# Patient Record
Sex: MALE | Race: WHITE | NOT HISPANIC OR LATINO | URBAN - METROPOLITAN AREA
[De-identification: names, ages, dates, MRNs, and addresses within clinical notes are randomized per-mention and may not be internally consistent; named-entity substitution may affect disease eponyms.]

---

## 2017-03-06 ENCOUNTER — OFFICE VISIT (OUTPATIENT)
Dept: RETAIL CLINIC | Facility: CLINIC | Age: 27
End: 2017-03-06

## 2017-03-06 VITALS
BODY MASS INDEX: 17.5 KG/M2 | WEIGHT: 125 LBS | TEMPERATURE: 98.5 F | OXYGEN SATURATION: 98 % | HEIGHT: 71 IN | RESPIRATION RATE: 20 BRPM | HEART RATE: 84 BPM

## 2017-03-06 DIAGNOSIS — J01.90 ACUTE SINUSITIS, RECURRENCE NOT SPECIFIED, UNSPECIFIED LOCATION: Primary | ICD-10-CM

## 2017-03-06 DIAGNOSIS — H10.31 ACUTE CONJUNCTIVITIS OF RIGHT EYE, UNSPECIFIED ACUTE CONJUNCTIVITIS TYPE: ICD-10-CM

## 2017-03-06 LAB
EXPIRATION DATE: NORMAL
FLUAV AG NPH QL: NEGATIVE
FLUBV AG NPH QL: NEGATIVE
INTERNAL CONTROL: NORMAL
Lab: NORMAL

## 2017-03-06 PROCEDURE — 87804 INFLUENZA ASSAY W/OPTIC: CPT | Performed by: NURSE PRACTITIONER

## 2017-03-06 PROCEDURE — 99203 OFFICE O/P NEW LOW 30 MIN: CPT | Performed by: NURSE PRACTITIONER

## 2017-03-06 RX ORDER — DOXYCYCLINE HYCLATE 100 MG/1
100 CAPSULE ORAL 2 TIMES DAILY
Qty: 14 CAPSULE | Refills: 0 | Status: SHIPPED | OUTPATIENT
Start: 2017-03-06 | End: 2017-03-13

## 2017-03-06 RX ORDER — POLYMYXIN B SULFATE AND TRIMETHOPRIM 1; 10000 MG/ML; [USP'U]/ML
1 SOLUTION OPHTHALMIC EVERY 4 HOURS
Qty: 10 ML | Refills: 0 | Status: SHIPPED | OUTPATIENT
Start: 2017-03-06 | End: 2017-03-13

## 2017-03-06 NOTE — PROGRESS NOTES
Subjective   Mateo Willoughby is a 26 y.o. male presents for possible pink eye, sinus infection or the flu.  Denies having flu shot this season and unsure of exposure.  States has been having sinus problems for over one week and eye problems started 2 days ago.      Sinusitis   This is a new problem. The current episode started 1 to 4 weeks ago. The problem has been gradually worsening since onset. Maximum temperature: has not cked. His pain is at a severity of 4/10. The pain is moderate. Associated symptoms include chills, congestion, ear pain (ear pressure), headaches (frontal sinus pressure), sinus pressure, sneezing and a sore throat. Pertinent negatives include no coughing, diaphoresis, hoarse voice, neck pain, shortness of breath or swollen glands. Treatments tried: dayquil, tylenol. The treatment provided mild relief.   Conjunctivitis    The current episode started 2 days ago. The problem occurs continuously. The problem has been gradually worsening. The problem is mild. Nothing relieves the symptoms. Nothing aggravates the symptoms. Associated symptoms include eye itching, photophobia, congestion, ear pain (ear pressure), headaches (frontal sinus pressure), rhinorrhea, sore throat, eye discharge (states having yellow eye drainage with some crusting of eye lashes in the am) and eye redness. Pertinent negatives include no fever (has not cked), no decreased vision, no double vision, no ear discharge, no hearing loss, no mouth sores, no stridor, no swollen glands, no neck pain, no cough, no wheezing and no eye pain. The right eye is affected. There were no sick contacts. He has received no recent medical care.        The following portions of the patient's history were reviewed and updated as appropriate: allergies, current medications, past family history, past medical history, past social history and past surgical history.    Review of Systems   Constitutional: Positive for chills. Negative for diaphoresis,  fever (has not cked) and unexpected weight change.   HENT: Positive for congestion, ear pain (ear pressure), postnasal drip, rhinorrhea, sinus pressure, sneezing and sore throat. Negative for ear discharge, hearing loss, hoarse voice, mouth sores, trouble swallowing and voice change.    Eyes: Positive for photophobia, discharge (states having yellow eye drainage with some crusting of eye lashes in the am), redness and itching. Negative for double vision and pain.   Respiratory: Negative for cough, chest tightness, shortness of breath, wheezing and stridor.    Cardiovascular: Negative.    Gastrointestinal: Negative.    Genitourinary: Negative.    Musculoskeletal: Negative.  Negative for neck pain.   Skin: Negative.    Neurological: Positive for headaches (frontal sinus pressure).       Objective   Physical Exam   Constitutional: He is oriented to person, place, and time. He appears well-developed and well-nourished. No distress.   HENT:   Head: Normocephalic and atraumatic.   Right Ear: External ear and ear canal normal. Tympanic membrane is not erythematous. A middle ear effusion is present.   Left Ear: External ear and ear canal normal. Tympanic membrane is not erythematous. A middle ear effusion is present.   Nose: Mucosal edema present. Right sinus exhibits maxillary sinus tenderness and frontal sinus tenderness. Left sinus exhibits maxillary sinus tenderness and frontal sinus tenderness.   Mouth/Throat: Uvula is midline and mucous membranes are normal. No uvula swelling. Posterior oropharyngeal erythema (mild erythema with pnd) present. Tonsils are 0 on the right. Tonsils are 0 on the left. No tonsillar exudate.   Eyes: EOM and lids are normal. Pupils are equal, round, and reactive to light. Right conjunctiva is injected. Left conjunctiva is not injected.   Right eye conjunctivae mild injected, no drainage at present in either eye.    Visual acuity Right 20/13, Left 20/10 and Bilat 20/10   Neck: Normal range of  motion.   Cardiovascular: Normal rate, regular rhythm and normal heart sounds.    No murmur heard.  Pulmonary/Chest: Effort normal and breath sounds normal. No respiratory distress. He has no wheezes.   Lymphadenopathy:     He has cervical adenopathy (bilat anterior cervical).   Neurological: He is alert and oriented to person, place, and time.   Skin: Skin is warm and dry. No rash noted.   Psychiatric: He has a normal mood and affect. His speech is normal and behavior is normal. Thought content normal.       Assessment/Plan   Diagnoses and all orders for this visit:    Acute sinusitis, recurrence not specified, unspecified location  -     POC Influenza A / B, negative, discussed with patient  -     doxycycline (VIBRAMYCIN) 100 MG capsule; Take 1 capsule by mouth 2 (Two) Times a Day for 7 days.  -     loratadine-pseudoephedrine (CLARITIN-D 24 HOUR)  MG per 24 hr tablet; Take 1 tablet by mouth Daily for 10 days.      Acute conjunctivitis of right eye, unspecified acute conjunctivitis type  -     trimethoprim-polymyxin b (POLYTRIM) 10903-3.1 UNIT/ML-% ophthalmic solution; Administer 1 drop to the right eye Every 4 (Four) Hours for 7 days.      Medication and plan of care reviewed with patient and teaching done on med reactions. Eye care as discussed, if worse go to eye doctor, urgent care or ER as discussed.  Rest, plenty of fluids, comfort measures, warm salt water gargles, humidifier, and follow up with PCP if symptoms persist.  If worse go to urgent care or ER as discussed.  Patient verbalized understanding.    VALARIE Osullivan

## 2017-03-06 NOTE — PATIENT INSTRUCTIONS
Loratadine; Pseudoephedrine tablets  What is this medicine?  LORATADINE; PSEUDOEPHEDRINE (kelsey AT a kait; ric mast e FED rin) is a combination of an antihistamine and a decongestant. This medicine is used to treat the symptoms of allergies. It reduces congestion, sneezing, runny nose, and itching.  This medicine may be used for other purposes; ask your health care provider or pharmacist if you have questions.  COMMON BRAND NAME(S): Alavert-D, Allergy and Sinus Relief, Claritin-D, Claritin-D 12 Hour, Claritin-D 24 Hour, Clear-Atadine D  What should I tell my health care provider before I take this medicine?  They need to know if you have any of these conditions:  -diabetes  -difficulty urinating  -heart disease  -high blood pressure  -kidney disease  -liver disease  -thyroid disease  -an unusual or allergic reaction to loratadine, pseudoephedrine, other medicines, foods, dyes, or preservatives  -pregnant or trying to get pregnant  -breast-feeding  How should I use this medicine?  Take this medicine by mouth with a full glass of water. Follow the directions on the label. You can take it with or without food. Do not cut, crush or chew this medicine. Take your medicine at regular intervals. Do not take your medicine more often than directed.  Talk to your pediatrician regarding the use of this medicine in children. While this medicine may be used in children as young as 12 years for selected conditions, precautions do apply.  Overdosage: If you think you have taken too much of this medicine contact a poison control center or emergency room at once.  NOTE: This medicine is only for you. Do not share this medicine with others.  What if I miss a dose?  If you miss a dose, take it as soon as you can. If it is almost time for your next dose, take only that dose. Do not take double or extra doses.  What may interact with this medicine?  Do not take this medicine with any of the following medications:  -bromocriptine  -ergot  alkaloids like dihydroergotamine, ergonovine, ergotamine, methylergonovine  -MAOIs like Carbex, Eldepryl, Marplan, Nardil, and Parnate  -medicines for allergies, colds, breathing difficulties  -procarbazine  -stimulant medicines for attention disorders, weight loss, or to stay awake  This medicine may also interact with the following medications:  -digoxin  -efavirenz  -linezolid  -medicines for depression like amitriptyline, nortriptyline  -medicines for heart disease or blood pressure like atenolol, clonidine, doxazosin, mecamylamine, methyldopa, reserpine  -Mara's wort  -theophylline  -tizanidine  -yohimbine  This list may not describe all possible interactions. Give your health care provider a list of all the medicines, herbs, non-prescription drugs, or dietary supplements you use. Also tell them if you smoke, drink alcohol, or use illegal drugs. Some items may interact with your medicine.  What should I watch for while using this medicine?  Tell your doctor or healthcare professional if your symptoms do not start to get better, if they get worse or if you have a high fever. If you have high blood pressure, check your blood pressure regularly. Ask your health care professional what your blood pressure should be, and when you should contact him or her.  Your mouth may get dry. Chewing sugarless gum or sucking hard candy, and drinking plenty of water may help. Contact your doctor if the problem does not go away or is severe.  What side effects may I notice from receiving this medicine?  Side effects that you should report to your doctor or health care professional as soon as possible:  -allergic reactions like skin rash or hives, swelling of the face, lips, or tongue  -breathing trouble  -difficulty urinating  -difficulty sleeping  -dizzy, faint  -fast, irregular heartbeat  -high blood pressure  -nervousness, agitation  -tremor, muscle contractions  -unusually weak or tired  Side effects that usually do not  require medical attention (report to your doctor or health care professional if they continue or are bothersome):  -cough  -dry mouth  -headache  -loss of appetite  -nausea  This list may not describe all possible side effects. Call your doctor for medical advice about side effects. You may report side effects to FDA at 8-778-FDA-5494.  Where should I keep my medicine?  Keep out of the reach of children.  Store at room temperature between 15 and 25 degrees C (59 and 77 degrees F). Protect from moisture and light. Throw away any unused medicine after the expiration date.  NOTE: This sheet is a summary. It may not cover all possible information. If you have questions about this medicine, talk to your doctor, pharmacist, or health care provider.     © 2016, [employer]. (2009-06-22 17:19:09)  Doxycycline tablets or capsules  What is this medicine?  DOXYCYCLINE (dox sandra KAYLEE yu) is a tetracycline antibiotic. It kills certain bacteria or stops their growth. It is used to treat many kinds of infections, like dental, skin, respiratory, and urinary tract infections. It also treats acne, Lyme disease, malaria, and certain sexually transmitted infections.  This medicine may be used for other purposes; ask your health care provider or pharmacist if you have questions.  COMMON BRAND NAME(S): Acticlate, Adoxa, Adoxa CK, Adoxa Nacho, Adoxa TT, Alodox, Avidoxy, Doxal, Mondoxyne NL, Monodox, Morgidox 1x, Morgidox 1x Kit, Morgidox 2x, Morgidox 2x Kit, NutriDox, Ocudox, TARGADOX, Vibra-Tabs, Vibramycin  What should I tell my health care provider before I take this medicine?  They need to know if you have any of these conditions:  -liver disease  -long exposure to sunlight like working outdoors  -stomach problems like colitis  -an unusual or allergic reaction to doxycycline, tetracycline antibiotics, other medicines, foods, dyes, or preservatives  -pregnant or trying to get pregnant  -breast-feeding  How should I use this  medicine?  Take this medicine by mouth with a full glass of water. Follow the directions on the prescription label. It is best to take this medicine without food, but if it upsets your stomach take it with food. Take your medicine at regular intervals. Do not take your medicine more often than directed. Take all of your medicine as directed even if you think you are better. Do not skip doses or stop your medicine early.  Talk to your pediatrician regarding the use of this medicine in children. While this drug may be prescribed for selected conditions, precautions do apply.  Overdosage: If you think you have taken too much of this medicine contact a poison control center or emergency room at once.  NOTE: This medicine is only for you. Do not share this medicine with others.  What if I miss a dose?  If you miss a dose, take it as soon as you can. If it is almost time for your next dose, take only that dose. Do not take double or extra doses.  What may interact with this medicine?  -antacids  -barbiturates  -birth control pills  -bismuth subsalicylate  -carbamazepine  -methoxyflurane  -other antibiotics  -phenytoin  -vitamins that contain iron  -warfarin  This list may not describe all possible interactions. Give your health care provider a list of all the medicines, herbs, non-prescription drugs, or dietary supplements you use. Also tell them if you smoke, drink alcohol, or use illegal drugs. Some items may interact with your medicine.  What should I watch for while using this medicine?  Tell your doctor or health care professional if your symptoms do not improve.  Do not treat diarrhea with over the counter products. Contact your doctor if you have diarrhea that lasts more than 2 days or if it is severe and watery.  Do not take this medicine just before going to bed. It may not dissolve properly when you lay down and can cause pain in your throat. Drink plenty of fluids while taking this medicine to also help reduce  irritation in your throat.  This medicine can make you more sensitive to the sun. Keep out of the sun. If you cannot avoid being in the sun, wear protective clothing and use sunscreen. Do not use sun lamps or tanning beds/booths.  Birth control pills may not work properly while you are taking this medicine. Talk to your doctor about using an extra method of birth control.  If you are being treated for a sexually transmitted infection, avoid sexual contact until you have finished your treatment. Your sexual partner may also need treatment.  Avoid antacids, aluminum, calcium, magnesium, and iron products for 4 hours before and 2 hours after taking a dose of this medicine.  If you are using this medicine to prevent malaria, you should still protect yourself from contact with mosquitos. Stay in screened-in areas, use mosquito nets, keep your body covered, and use an insect repellent.  What side effects may I notice from receiving this medicine?  Side effects that you should report to your doctor or health care professional as soon as possible:  -allergic reactions like skin rash, itching or hives, swelling of the face, lips, or tongue  -difficulty breathing  -fever  -itching in the rectal or genital area  -pain on swallowing  -redness, blistering, peeling or loosening of the skin, including inside the mouth  -severe stomach pain or cramps  -unusual bleeding or bruising  -unusually weak or tired  -yellowing of the eyes or skin  Side effects that usually do not require medical attention (report to your doctor or health care professional if they continue or are bothersome):  -diarrhea  -loss of appetite  -nausea, vomiting  This list may not describe all possible side effects. Call your doctor for medical advice about side effects. You may report side effects to FDA at 0-455-FDA-5371.  Where should I keep my medicine?  Keep out of the reach of children.  Store at room temperature, below 30 degrees C (86 degrees F). Protect  from light. Keep container tightly closed. Throw away any unused medicine after the expiration date. Taking this medicine after the expiration date can make you seriously ill.  NOTE: This sheet is a summary. It may not cover all possible information. If you have questions about this medicine, talk to your doctor, pharmacist, or health care provider.     © 2016, [employer] Standard. (2016-04-08 12:10:28)  Polymyxin B; Trimethoprim eye drops, solution  What is this medicine?  POLYMYXIN B and TRIMETHOPRIM (molly i MIX in B and trye METH oh prim) eye drops treat certain eye infections caused by bacteria.  This medicine may be used for other purposes; ask your health care provider or pharmacist if you have questions.  COMMON BRAND NAME(S): Polytrim  What should I tell my health care provider before I take this medicine?  They need to know if you have any of these conditions:  -wear contact lenses  -an unusual or allergic reaction to polymyxin B, trimethoprim, other medicines, foods, dyes, or preservatives  -pregnant or trying to get pregnant  -breast-feeding  How should I use this medicine?  This medicine is used in the eye. Follow the directions on the prescription label. Wash your hands before and after use. Tilt your head back slightly. Pull your lower eyelid down gently to form a pouch. Do not touch the tip of the dropper to your eye, fingertips, or other surface. Squeeze the prescribed number of drops into the pouch. Close the eye gently to spread the drops. Use your medicine at regular intervals. Do not take your medicine more often than directed. Use all of your medicine as directed even if you think your are better. Do not skip doses or stop your medicine early.  Talk to your pediatrician regarding the use of this medicine in children. While this drug may be prescribed for children and infants for selected conditions, precautions do apply.  Overdosage: If you think you have taken too much of this medicine  contact a poison control center or emergency room at once.  NOTE: This medicine is only for you. Do not share this medicine with others.  What if I miss a dose?  If you miss a dose, use it as soon as you can. If it is almost time for your next dose, use only that dose. Do not use double or extra doses.  What may interact with this medicine?  Interactions are not expected. Do not use any other eye products without advice of your doctor or health care professional.  This list may not describe all possible interactions. Give your health care provider a list of all the medicines, herbs, non-prescription drugs, or dietary supplements you use. Also tell them if you smoke, drink alcohol, or use illegal drugs. Some items may interact with your medicine.  What should I watch for while using this medicine?  Check with your doctor or health care professional if your condition does not get better after 5 days, or if it gets worse.  If you wear contact lenses, ask when you can use your lenses again.  A burning or stinging reaction that does not go away may mean you are allergic to this product. Stop use and call your doctor or health care professional.  To prevent the spread of infection, do not share eye products or other personal items with anyone else.  What side effects may I notice from receiving this medicine?  Side effects that you should report to your doctor or health care professional as soon as possible:  -burning, stinging, or swelling  -change in vision or blurred vision that will not go away  -eye pain  -itching and redness  -rash  Side effects that usually do not require medical attention (report to your doctor or health care professional if they continue or are bothersome):  -temporary blurred vision after applying  -temporary watering or stinging  This list may not describe all possible side effects. Call your doctor for medical advice about side effects. You may report side effects to FDA at  1-800-FDA-1088.  Where should I keep my medicine?  Keep out of the reach of children.  Store at room temperature 15 to 25 degrees C (59 to 77 degrees F). Protect from light. To prevent the spread of infection, it is best to throw away any unused eye drops after you finish the course of treatment. Throw away any unused medicine after the expiration date.  NOTE: This sheet is a summary. It may not cover all possible information. If you have questions about this medicine, talk to your doctor, pharmacist, or health care provider.     © 2016, [employer] Standard. (2009-07-21 11:36:42)  Bacterial Conjunctivitis  Bacterial conjunctivitis (commonly called pink eye) is redness, soreness, or puffiness (inflammation) of the white part of your eye. It is caused by a germ called bacteria. These germs can easily spread from person to person (contagious). Your eye often will become red or pink. Your eye may also become irritated, watery, or have a thick discharge.   HOME CARE   · Apply a cool, clean washcloth over closed eyelids. Do this for 10-20 minutes, 3-4 times a day while you have pain.  · Gently wipe away any fluid coming from the eye with a warm, wet washcloth or cotton ball.  · Wash your hands often with soap and water. Use paper towels to dry your hands.  · Do not share towels or washcloths.  · Change or wash your pillowcase every day.  · Do not use eye makeup until the infection is gone.  · Do not use machines or drive if your vision is blurry.  · Stop using contact lenses. Do not use them again until your doctor says it is okay.  · Do not touch the tip of the eye drop bottle or medicine tube with your fingers when you put medicine on the eye.  GET HELP RIGHT AWAY IF:   · Your eye is not better after 3 days of starting your medicine.  · You have a yellowish fluid coming out of the eye.  · You have more pain in the eye.  · Your eye redness is spreading.  · Your vision becomes blurry.  · You have a fever or lasting  symptoms for more than 2-3 days.  · You have a fever and your symptoms suddenly get worse.  · You have pain in the face.  · Your face gets red or puffy (swollen).  MAKE SURE YOU:   · Understand these instructions.  · Will watch this condition.  · Will get help right away if you are not doing well or get worse.     This information is not intended to replace advice given to you by your health care provider. Make sure you discuss any questions you have with your health care provider.     Document Released: 09/26/2009 Document Revised: 12/04/2013 Document Reviewed: 09/29/2016  [employer] Patient Education ©2016 [employer]  Sinusitis, Adult  Sinusitis is redness, soreness, and puffiness (inflammation) of the air pockets in the bones of your face (sinuses). The redness, soreness, and puffiness can cause air and mucus to get trapped in your sinuses. This can allow germs to grow and cause an infection.   HOME CARE   · Drink enough fluids to keep your pee (urine) clear or pale yellow.  · Use a humidifier in your home.  · Run a hot shower to create steam in the bathroom. Sit in the bathroom with the door closed. Breathe in the steam 3-4 times a day.  · Put a warm, moist washcloth on your face 3-4 times a day, or as told by your doctor.  · Use salt water sprays (saline sprays) to wet the thick fluid in your nose. This can help the sinuses drain.  · Only take medicine as told by your doctor.  GET HELP RIGHT AWAY IF:   · Your pain gets worse.  · You have very bad headaches.  · You are sick to your stomach (nauseous).  · You throw up (vomit).  · You are very sleepy (drowsy) all the time.  · Your face is puffy (swollen).  · Your vision changes.  · You have a stiff neck.  · You have trouble breathing.  MAKE SURE YOU:   · Understand these instructions.  · Will watch your condition.  · Will get help right away if you are not doing well or get worse.     This information is not intended to replace advice given to you by  your health care provider. Make sure you discuss any questions you have with your health care provider.    Medication and plan of care reviewed with patient and teaching done on med reactions. Eye care as discussed, if worse go to eye doctor, urgent care or ER as discussed.  Rest, plenty of fluids, comfort measures, warm salt water gargles, humidifier, and follow up with PCP if symptoms persist.  If worse go to urgent care or ER as discussed.     Document Released: 06/05/2009 Document Revised: 01/08/2016 Document Reviewed: 10/12/2016  [employer] Interactive Patient Education ©2016 [employer]